# Patient Record
Sex: FEMALE | Race: WHITE
[De-identification: names, ages, dates, MRNs, and addresses within clinical notes are randomized per-mention and may not be internally consistent; named-entity substitution may affect disease eponyms.]

---

## 2018-06-27 ENCOUNTER — HOSPITAL ENCOUNTER (OUTPATIENT)
Dept: HOSPITAL 47 - RADMAMWWP | Age: 53
Discharge: HOME | End: 2018-06-27
Payer: COMMERCIAL

## 2018-06-27 DIAGNOSIS — Z12.31: Primary | ICD-10-CM

## 2018-06-27 PROCEDURE — 77063 BREAST TOMOSYNTHESIS BI: CPT

## 2018-06-27 PROCEDURE — 77067 SCR MAMMO BI INCL CAD: CPT

## 2018-06-28 NOTE — MM
Reason for exam: screening  (asymptomatic).

Last mammogram was performed 4 years ago.



History:

Patient is postmenopausal.



Physical Findings:

A clinical breast exam by your physician is recommended on an annual basis and 

results should be correlated with mammographic findings.



MG 3D Screening Mammo W/Cad

Bilateral CC and MLO view(s) were taken.

Prior study comparison: June 13, 2014, bilateral MG screening mammo w CAD.

The breast tissue is heterogeneously dense. This may lower the sensitivity of 

mammography.  Previous linear density superior left breast no longer present.  No 

significant changes when compared with prior studies.





ASSESSMENT: Negative, BI-RAD 1



RECOMMENDATION:

Routine screening mammogram of both breasts in 1 year.

## 2018-09-21 ENCOUNTER — HOSPITAL ENCOUNTER (OUTPATIENT)
Dept: HOSPITAL 47 - RADCTMAIN | Age: 53
Discharge: HOME | End: 2018-09-21
Payer: COMMERCIAL

## 2018-09-21 DIAGNOSIS — R31.9: Primary | ICD-10-CM

## 2018-09-21 DIAGNOSIS — R10.84: ICD-10-CM

## 2018-09-21 PROCEDURE — 74177 CT ABD & PELVIS W/CONTRAST: CPT

## 2018-09-23 NOTE — CT
EXAMINATION TYPE: CT abdomen pelvis w con

 

DATE OF EXAM: 9/21/2018

 

COMPARISON: 11/21/2016

 

HISTORY: Left sided flank and pelvic pain with distention

 

CT DLP: 1599 mGycm

Automated exposure control for dose reduction was used.

 

CONTRAST: 

CT scan of the abdomen pelvis is performed with IV Contrast, patient injected with 100 mL of Isovue 3
00.

 

FINDINGS- 

LUNG BASES-  No significant abnormality is appreciated. 

 

LIVER/GB-postcholecystectomy changes noted. Hypodensity in the lateral margin of the liver measures a
 centimeter and too small to characterize but stable and therefore likely related to benign.

 

PANCREAS-  No gross abnormality is seen.

 

SPLEEN-  No gross abnormality is seen. 

 

ADRENALS-  No gross abnormality is seen.

 

KIDNEYS/BLADDER- no hydronephrosis or nephrolithiasis. Subcentimeter hypodensity lateral cortex left 
kidney too small to characterize but likely related to simple cyst.

   

BOWEL-gas pattern nonspecific. Changes of diverticulosis noted.. : Limited for wall thickness or muco
sal lesion secondary to lack of distention or contrast within the colon. Normal appendix. 

  

LYMPH NODES-  No greater than 1cm abdominal or pelvic lymph nodes areappreciated. 

 

OSSEOUS STRUCTURES-level degenerative disc disease and hypertrophic changes are noted.. 

 

OTHER-  aorta of normal caliber with atherosclerotic changes. 

 

IMPRESSION- 

1. No acute process.

## 2020-07-13 ENCOUNTER — HOSPITAL ENCOUNTER (OUTPATIENT)
Dept: HOSPITAL 47 - BARWHC3 | Age: 55
Discharge: HOME | End: 2020-07-13
Attending: SURGERY
Payer: COMMERCIAL

## 2020-07-13 VITALS — BODY MASS INDEX: 36.7 KG/M2

## 2020-07-13 VITALS
TEMPERATURE: 98.6 F | RESPIRATION RATE: 16 BRPM | DIASTOLIC BLOOD PRESSURE: 83 MMHG | HEART RATE: 88 BPM | SYSTOLIC BLOOD PRESSURE: 136 MMHG

## 2020-07-13 DIAGNOSIS — E66.01: ICD-10-CM

## 2020-07-13 DIAGNOSIS — Z98.890: ICD-10-CM

## 2020-07-13 DIAGNOSIS — E55.9: ICD-10-CM

## 2020-07-13 DIAGNOSIS — E88.81: Primary | ICD-10-CM

## 2020-07-13 DIAGNOSIS — F17.200: ICD-10-CM

## 2020-07-13 LAB
ERYTHROCYTE [DISTWIDTH] IN BLOOD BY AUTOMATED COUNT: 4.04 M/UL (ref 3.8–5.4)
ERYTHROCYTE [DISTWIDTH] IN BLOOD: 13.5 % (ref 11.5–15.5)
HCT VFR BLD AUTO: 40.3 % (ref 34–46)
HGB BLD-MCNC: 13.4 GM/DL (ref 11.4–16)
MCH RBC QN AUTO: 33.1 PG (ref 25–35)
MCHC RBC AUTO-ENTMCNC: 33.2 G/DL (ref 31–37)
MCV RBC AUTO: 99.7 FL (ref 80–100)
PLATELET # BLD AUTO: 202 K/UL (ref 150–450)
WBC # BLD AUTO: 8.1 K/UL (ref 3.8–10.6)

## 2020-07-13 PROCEDURE — 84425 ASSAY OF VITAMIN B-1: CPT

## 2020-07-13 PROCEDURE — 82607 VITAMIN B-12: CPT

## 2020-07-13 PROCEDURE — 85027 COMPLETE CBC AUTOMATED: CPT

## 2020-07-13 PROCEDURE — 80053 COMPREHEN METABOLIC PANEL: CPT

## 2020-07-13 PROCEDURE — 99201: CPT

## 2020-07-13 PROCEDURE — 93005 ELECTROCARDIOGRAM TRACING: CPT

## 2020-07-13 PROCEDURE — 36415 COLL VENOUS BLD VENIPUNCTURE: CPT

## 2020-07-13 PROCEDURE — 82306 VITAMIN D 25 HYDROXY: CPT

## 2020-07-13 PROCEDURE — 82746 ASSAY OF FOLIC ACID SERUM: CPT

## 2020-07-13 PROCEDURE — 83036 HEMOGLOBIN GLYCOSYLATED A1C: CPT

## 2020-07-13 NOTE — P.HPBAR
Bariatric H&P





- History & Physicial


H&P Date: 07/13/20


History & Physicial: 


Visit/CC: initial visit





Patient initial contact: 7/2/20





Initial weight: 100.108 kg


Initial weight in pounds: 220.70


Height: 5 ft 5 in


Initial BMI: 36.7





Last weight: 





Current weight: 100.108 kg


Current weight in pounds: 220.70


Current BMI: 36.7





Ideal body weight (based on NIH guidelines): 56.699 kg





Excess body weight loss: 0.0%








The patient is a 54 year-old F who presents for Bariatric Assessment.  Patient 

presents today for presurgical consultation.  She is interested in sleeve 

gastrectomy.














Past Medical History


Past Medical History: GERD/Reflux, Hyperlipidemia, Hypertension, Osteoarthritis 

(OA), Thyroid Disorder


History of Any Multi-Drug Resistant Organisms: None Reported


Additional Past Surgical History / Comment(s): uterine ablation, sinus surgery, 

cystic tumore removed left breast, liposuction, oral surgeries


Past Anesthesia/Blood Transfusion Reactions: No Reported Reaction


Past Psychological History: No Psychological Hx Reported


Smoking Status: Current some day smoker





- Past Family History


  ** Mother


Family Medical History: Cancer


Additional Family Medical History / Comment(s): lung CA





Surgical - Exam


                                   Vital Signs











Temp Pulse Resp BP Pulse Ox


 


 98.6 F   88   16   136/83   98 


 


 07/13/20 14:17  07/13/20 14:17  07/13/20 14:17  07/13/20 14:17  07/13/20 14:17














- General


well developed, well nourished, no distress





- Eyes


PERRL





- ENT


normal pinna





- Neck


no masses





- Respiratory


normal expansion





- Cardiovascular


Rhythm: regular





- Abdomen


Abdomen: soft, non tender





Bariatric Assessment & Plan


Plan: 





Morbid obesity, BMI 37.  Patient is an exogenously gastric.  Went over the risks

and benefits of procedure including staple line disruption bleeding scar.  

Patient will undergo EGD.  She'll follow-up in clinic after this is been 

performed.





Bariatric Checklist


Checklist: 


Plan: 





Checklist: 





EGD: 


1. Hiatal hernia: 


2. H. Pylori: 





HgbA1c: 





Vitamin D: 





Smoking: Current some day smoker





Primary care physician referral: [facility]





Psychiatry clearance: 





Cardiology clearance: 





Sleep study: 





Diet journal: 





VTE risk score: 





VTE risk level: 





Rehab needs at discharge:

## 2020-07-14 LAB
ALBUMIN SERPL-MCNC: 4.4 G/DL (ref 3.8–4.9)
ALBUMIN/GLOB SERPL: 2.1 G/DL (ref 1.6–3.17)
ALP SERPL-CCNC: 63 U/L (ref 41–126)
ALT SERPL-CCNC: 45 U/L (ref 8–44)
ANION GAP SERPL CALC-SCNC: 7.4 MMOL/L (ref 4–12)
AST SERPL-CCNC: 33 U/L (ref 13–35)
BUN SERPL-SCNC: 15 MG/DL (ref 9–27)
BUN/CREAT SERPL: 16.67 RATIO (ref 12–20)
CALCIUM SPEC-MCNC: 9.5 MG/DL (ref 8.7–10.3)
CHLORIDE SERPL-SCNC: 108 MMOL/L (ref 96–109)
CO2 SERPL-SCNC: 29.6 MMOL/L (ref 21.6–31.8)
GLOBULIN SER CALC-MCNC: 2.1 G/DL (ref 1.6–3.3)
GLUCOSE SERPL-MCNC: 112 MG/DL (ref 70–110)
HBA1C MFR BLD: 5 % (ref 4–6)
POTASSIUM SERPL-SCNC: 4.6 MMOL/L (ref 3.5–5.5)
PROT SERPL-MCNC: 6.5 G/DL (ref 6.2–8.2)
SODIUM SERPL-SCNC: 145 MMOL/L (ref 135–145)
VIT B12 SERPL-MCNC: 893 PG/ML (ref 200–944)

## 2020-08-07 ENCOUNTER — HOSPITAL ENCOUNTER (OUTPATIENT)
Dept: HOSPITAL 47 - ORWHC2ENDO | Age: 55
End: 2020-08-07
Attending: SURGERY
Payer: COMMERCIAL

## 2020-08-07 VITALS — HEART RATE: 51 BPM | SYSTOLIC BLOOD PRESSURE: 113 MMHG | RESPIRATION RATE: 18 BRPM | DIASTOLIC BLOOD PRESSURE: 66 MMHG

## 2020-08-07 VITALS — TEMPERATURE: 97.8 F

## 2020-08-07 VITALS — BODY MASS INDEX: 37.5 KG/M2

## 2020-08-07 DIAGNOSIS — I10: ICD-10-CM

## 2020-08-07 DIAGNOSIS — M19.90: ICD-10-CM

## 2020-08-07 DIAGNOSIS — F17.200: ICD-10-CM

## 2020-08-07 DIAGNOSIS — Z79.890: ICD-10-CM

## 2020-08-07 DIAGNOSIS — Z79.51: ICD-10-CM

## 2020-08-07 DIAGNOSIS — Z80.9: ICD-10-CM

## 2020-08-07 DIAGNOSIS — Z91.09: ICD-10-CM

## 2020-08-07 DIAGNOSIS — Z88.1: ICD-10-CM

## 2020-08-07 DIAGNOSIS — E66.01: ICD-10-CM

## 2020-08-07 DIAGNOSIS — E07.9: ICD-10-CM

## 2020-08-07 DIAGNOSIS — K29.50: Primary | ICD-10-CM

## 2020-08-07 DIAGNOSIS — K21.9: ICD-10-CM

## 2020-08-07 DIAGNOSIS — Z79.899: ICD-10-CM

## 2020-08-07 DIAGNOSIS — Z98.890: ICD-10-CM

## 2020-08-07 DIAGNOSIS — E78.5: ICD-10-CM

## 2020-08-07 PROCEDURE — 43239 EGD BIOPSY SINGLE/MULTIPLE: CPT

## 2020-08-07 PROCEDURE — 88305 TISSUE EXAM BY PATHOLOGIST: CPT

## 2020-08-07 NOTE — P.GSHP
History of Present Illness


H&P Date: 08/07/20


Chief Complaint: GERD, morbid obesity





This a 54-year-old female who presents today for EGD.  She'll undergo workup for

gastric sleeve surgery.  She's had complaints of GERD.  Her BMI is 37





Past Medical History


Past Medical History: GERD/Reflux, Hyperlipidemia, Hypertension, Osteoarthritis 

(OA), Thyroid Disorder


History of Any Multi-Drug Resistant Organisms: None Reported


Past Surgical History: Breast Surgery, Uterine Ablation


Additional Past Surgical History / Comment(s): sinus surgery, cystic tumor 

removed left breast, liposuction, oral surgeries


Past Anesthesia/Blood Transfusion Reactions: No Reported Reaction


Smoking Status: Current some day smoker





- Past Family History


  ** Mother


Family Medical History: Cancer





Medications and Allergies


                                Home Medications











 Medication  Instructions  Recorded  Confirmed  Type


 


Beclomethasone Dipropionate [Qvar 1 puff INHALATION BID PRN 11/22/16 08/06/20 

History





40 mcg]    


 


Levothyroxine Sodium [Synthroid] 100 mcg PO DAILY 11/22/16 08/06/20 History


 


Losartan Potassium [Cozaar] 25 mg PO DAILY 11/22/16 08/06/20 History


 


Montelukast [Singulair] 10 mg PO HS 11/22/16 08/06/20 History


 


Multivitamins, Thera [Multivitamin] 1 tab PO DAILY 11/22/16 08/06/20 History


 


traZODone  mg PO HS 11/22/16 08/06/20 History


 


Omeprazole 20 mg PO DAILY 07/13/20 08/06/20 History


 


Black Cohosh 540 mg PO DAILY 08/06/20 08/06/20 History


 


Cholecalciferol [Vitamin D3 (25 1,000 unit PO DAILY 08/06/20 08/06/20 History





Mcg = 1000 Iu)]    


 


Cyanocobalamin [Vitamin B-12] 500 mcg PO DAILY 08/06/20 08/06/20 History


 


Pravastatin Sodium [Pravachol] 40 mg PO HS 08/06/20 08/06/20 History








                                    Allergies











Allergy/AdvReac Type Severity Reaction Status Date / Time


 


cat dander Allergy  Unknown Verified 08/06/20 11:57


 


erythromycin base Allergy  Abdominal Verified 08/06/20 11:57





   Pain  














Surgical - Exam


                                   Vital Signs











Temp Pulse Resp BP Pulse Ox


 


 97.8 F   78   18   132/75   98 


 


 08/07/20 10:30  08/07/20 10:30  08/07/20 10:30  08/07/20 10:30  08/07/20 10:30














- General


well developed, well nourished, no distress





- Eyes


PERRL





- ENT


normal pinna





- Neck


no masses





- Respiratory


normal expansion





- Cardiovascular


Rhythm: regular





- Abdomen


Abdomen: soft, non tender





Assessment and Plan


Assessment: 





Morbid obesity, BMI 37





GERD we'll perform EGD.

## 2020-08-07 NOTE — P.OP
Date of Procedure: 08/07/20


Preoperative Diagnosis: 


GERD





Morbid obesity


Postoperative Diagnosis: 


Antral gastritis


Procedure(s) Performed: 


EGD


Anesthesia: MAC


Surgeon: Johnny Jolley


Pathology: other (Antrum)


Condition: stable


Disposition: PACU


Description of Procedure: 


Patient's placed on the endoscopy table in the lateral position.  She received 

IV sedation.  The possible gastroscope some placed oropharynx passed in the 

esophagus and into the stomach.  Scope was then placed through the pylorus.  

First and second portion of the duodenum appeared normal.  Scope was then 

brought back the antrum this.  Mildly inflamed.  A biopsies performed.  Scope 

was retroflexed and remainder stomach appeared normal.  The GE junction was at 

40 cm.  There is no significant hiatal hernia.  The distal esophagus appeared 

normal.  Scope proximal appeared normal esophagus.  Normal scope was withdrawn 

for patient.

## 2021-01-11 ENCOUNTER — HOSPITAL ENCOUNTER (OUTPATIENT)
Dept: HOSPITAL 47 - BARWHC3 | Age: 56
Discharge: HOME | End: 2021-01-11
Attending: SURGERY
Payer: COMMERCIAL

## 2021-01-11 VITALS — BODY MASS INDEX: 36.8 KG/M2

## 2021-01-11 DIAGNOSIS — E66.01: Primary | ICD-10-CM

## 2021-01-11 DIAGNOSIS — Z71.3: ICD-10-CM

## 2021-01-11 PROCEDURE — 97804 MEDICAL NUTRITION GROUP: CPT

## 2021-02-22 ENCOUNTER — HOSPITAL ENCOUNTER (OUTPATIENT)
Dept: HOSPITAL 47 - BARWHC3 | Age: 56
Discharge: HOME | End: 2021-02-22
Attending: SURGERY
Payer: COMMERCIAL

## 2021-02-22 ENCOUNTER — HOSPITAL ENCOUNTER (OUTPATIENT)
Dept: HOSPITAL 47 - LABPAT | Age: 56
Discharge: HOME | End: 2021-02-22
Attending: SURGERY
Payer: COMMERCIAL

## 2021-02-22 VITALS — SYSTOLIC BLOOD PRESSURE: 164 MMHG | DIASTOLIC BLOOD PRESSURE: 69 MMHG | HEART RATE: 60 BPM | TEMPERATURE: 97.7 F

## 2021-02-22 VITALS — BODY MASS INDEX: 36.1 KG/M2

## 2021-02-22 DIAGNOSIS — Z01.818: Primary | ICD-10-CM

## 2021-02-22 LAB
ALBUMIN SERPL-MCNC: 4.4 G/DL (ref 3.5–5)
ALP SERPL-CCNC: 72 U/L (ref 38–126)
ALT SERPL-CCNC: 33 U/L (ref 4–34)
ANION GAP SERPL CALC-SCNC: 10 MMOL/L
AST SERPL-CCNC: 33 U/L (ref 14–36)
BASOPHILS # BLD AUTO: 0.1 K/UL (ref 0–0.2)
BASOPHILS NFR BLD AUTO: 1 %
BUN SERPL-SCNC: 15 MG/DL (ref 7–17)
CALCIUM SPEC-MCNC: 9.6 MG/DL (ref 8.4–10.2)
CHLORIDE SERPL-SCNC: 99 MMOL/L (ref 98–107)
CO2 SERPL-SCNC: 31 MMOL/L (ref 22–30)
EOSINOPHIL # BLD AUTO: 0.6 K/UL (ref 0–0.7)
EOSINOPHIL NFR BLD AUTO: 9 %
ERYTHROCYTE [DISTWIDTH] IN BLOOD BY AUTOMATED COUNT: 4.59 M/UL (ref 3.8–5.4)
ERYTHROCYTE [DISTWIDTH] IN BLOOD: 13 % (ref 11.5–15.5)
GLUCOSE SERPL-MCNC: 103 MG/DL (ref 74–99)
HCT VFR BLD AUTO: 44.4 % (ref 34–46)
HGB BLD-MCNC: 14.4 GM/DL (ref 11.4–16)
LYMPHOCYTES # SPEC AUTO: 1.7 K/UL (ref 1–4.8)
LYMPHOCYTES NFR SPEC AUTO: 28 %
MCH RBC QN AUTO: 31.3 PG (ref 25–35)
MCHC RBC AUTO-ENTMCNC: 32.3 G/DL (ref 31–37)
MCV RBC AUTO: 96.8 FL (ref 80–100)
MONOCYTES # BLD AUTO: 0.4 K/UL (ref 0–1)
MONOCYTES NFR BLD AUTO: 6 %
NEUTROPHILS # BLD AUTO: 3.4 K/UL (ref 1.3–7.7)
NEUTROPHILS NFR BLD AUTO: 54 %
PLATELET # BLD AUTO: 226 K/UL (ref 150–450)
POTASSIUM SERPL-SCNC: 4.4 MMOL/L (ref 3.5–5.1)
PROT SERPL-MCNC: 7.2 G/DL (ref 6.3–8.2)
SODIUM SERPL-SCNC: 140 MMOL/L (ref 137–145)
WBC # BLD AUTO: 6.2 K/UL (ref 3.8–10.6)

## 2021-02-22 PROCEDURE — 85025 COMPLETE CBC W/AUTO DIFF WBC: CPT

## 2021-02-22 PROCEDURE — 80053 COMPREHEN METABOLIC PANEL: CPT

## 2021-02-22 PROCEDURE — 36415 COLL VENOUS BLD VENIPUNCTURE: CPT

## 2021-02-22 PROCEDURE — 99211 OFF/OP EST MAY X REQ PHY/QHP: CPT

## 2021-03-08 ENCOUNTER — HOSPITAL ENCOUNTER (INPATIENT)
Dept: HOSPITAL 47 - 2ORMAIN | Age: 56
LOS: 1 days | Discharge: HOME | DRG: 621 | End: 2021-03-09
Attending: SURGERY | Admitting: SURGERY
Payer: COMMERCIAL

## 2021-03-08 VITALS — BODY MASS INDEX: 34.5 KG/M2

## 2021-03-08 DIAGNOSIS — Z91.048: ICD-10-CM

## 2021-03-08 DIAGNOSIS — Z90.49: ICD-10-CM

## 2021-03-08 DIAGNOSIS — Z79.890: ICD-10-CM

## 2021-03-08 DIAGNOSIS — Z98.890: ICD-10-CM

## 2021-03-08 DIAGNOSIS — Z79.899: ICD-10-CM

## 2021-03-08 DIAGNOSIS — K21.9: ICD-10-CM

## 2021-03-08 DIAGNOSIS — Z98.51: ICD-10-CM

## 2021-03-08 DIAGNOSIS — E66.01: Primary | ICD-10-CM

## 2021-03-08 DIAGNOSIS — E78.5: ICD-10-CM

## 2021-03-08 DIAGNOSIS — Z88.1: ICD-10-CM

## 2021-03-08 DIAGNOSIS — Z80.9: ICD-10-CM

## 2021-03-08 DIAGNOSIS — Z87.891: ICD-10-CM

## 2021-03-08 DIAGNOSIS — I10: ICD-10-CM

## 2021-03-08 DIAGNOSIS — M19.90: ICD-10-CM

## 2021-03-08 DIAGNOSIS — E03.9: ICD-10-CM

## 2021-03-08 PROCEDURE — 80051 ELECTROLYTE PANEL: CPT

## 2021-03-08 PROCEDURE — 74240 X-RAY XM UPR GI TRC 1CNTRST: CPT

## 2021-03-08 PROCEDURE — 94760 N-INVAS EAR/PLS OXIMETRY 1: CPT

## 2021-03-08 PROCEDURE — 84520 ASSAY OF UREA NITROGEN: CPT

## 2021-03-08 PROCEDURE — 88307 TISSUE EXAM BY PATHOLOGIST: CPT

## 2021-03-08 PROCEDURE — 83735 ASSAY OF MAGNESIUM: CPT

## 2021-03-08 PROCEDURE — 85025 COMPLETE CBC W/AUTO DIFF WBC: CPT

## 2021-03-08 PROCEDURE — 94640 AIRWAY INHALATION TREATMENT: CPT

## 2021-03-08 PROCEDURE — 82565 ASSAY OF CREATININE: CPT

## 2021-03-08 PROCEDURE — 0DB64Z3 EXCISION OF STOMACH, PERCUTANEOUS ENDOSCOPIC APPROACH, VERTICAL: ICD-10-PCS

## 2021-03-08 PROCEDURE — 84100 ASSAY OF PHOSPHORUS: CPT

## 2021-03-08 PROCEDURE — 82310 ASSAY OF CALCIUM: CPT

## 2021-03-08 PROCEDURE — 76942 ECHO GUIDE FOR BIOPSY: CPT

## 2021-03-08 RX ADMIN — KETOROLAC TROMETHAMINE SCH MG: 15 INJECTION, SOLUTION INTRAMUSCULAR; INTRAVENOUS at 12:31

## 2021-03-08 RX ADMIN — SIMETHICONE PRN MG: 20 SUSPENSION/ DROPS ORAL at 15:13

## 2021-03-08 RX ADMIN — KETOROLAC TROMETHAMINE SCH MG: 15 INJECTION, SOLUTION INTRAMUSCULAR; INTRAVENOUS at 22:43

## 2021-03-08 RX ADMIN — POTASSIUM CHLORIDE SCH MLS: 14.9 INJECTION, SOLUTION INTRAVENOUS at 07:47

## 2021-03-08 RX ADMIN — ENOXAPARIN SODIUM SCH: 40 INJECTION SUBCUTANEOUS at 18:56

## 2021-03-08 RX ADMIN — ISODIUM CHLORIDE SCH: 0.03 SOLUTION RESPIRATORY (INHALATION) at 12:33

## 2021-03-08 RX ADMIN — KETOROLAC TROMETHAMINE SCH MG: 15 INJECTION, SOLUTION INTRAMUSCULAR; INTRAVENOUS at 17:03

## 2021-03-08 RX ADMIN — POTASSIUM CHLORIDE SCH: 14.9 INJECTION, SOLUTION INTRAVENOUS at 23:40

## 2021-03-08 RX ADMIN — THIAMINE HYDROCHLORIDE SCH MLS/HR: 100 INJECTION, SOLUTION INTRAMUSCULAR; INTRAVENOUS at 13:14

## 2021-03-08 RX ADMIN — ISODIUM CHLORIDE SCH: 0.03 SOLUTION RESPIRATORY (INHALATION) at 17:38

## 2021-03-08 RX ADMIN — THIAMINE HYDROCHLORIDE SCH MLS/HR: 100 INJECTION, SOLUTION INTRAMUSCULAR; INTRAVENOUS at 17:06

## 2021-03-08 RX ADMIN — ISODIUM CHLORIDE SCH MG: 0.03 SOLUTION RESPIRATORY (INHALATION) at 21:27

## 2021-03-08 RX ADMIN — HYDROMORPHONE HYDROCHLORIDE PRN MG: 1 INJECTION, SOLUTION INTRAMUSCULAR; INTRAVENOUS; SUBCUTANEOUS at 10:08

## 2021-03-08 RX ADMIN — THIAMINE HYDROCHLORIDE SCH: 100 INJECTION, SOLUTION INTRAMUSCULAR; INTRAVENOUS at 19:29

## 2021-03-08 RX ADMIN — HYDROMORPHONE HYDROCHLORIDE PRN MG: 1 INJECTION, SOLUTION INTRAMUSCULAR; INTRAVENOUS; SUBCUTANEOUS at 10:13

## 2021-03-08 NOTE — P.GSHP
History of Present Illness


H&P Date: 03/08/21


Chief Complaint: Morbid obesity, BMI 35





This is a 55-year-old female who presents today for laparoscopic sleeve gastric.

 Patient had lifetime problems obesity.  Her BMI is 35.  She has been well 

detailed on the procedure sleeve gastrectomy she understood the risks and benef

its procedure.  She is aware the risk of gastric staple line disruption, 

bleeding and scarring.





Past Medical History


Past Medical History: GERD/Reflux, Hyperlipidemia, Hypertension, Osteoarthritis 

(OA), Thyroid Disorder


History of Any Multi-Drug Resistant Organisms: None Reported


Past Surgical History: Breast Surgery, Cholecystectomy, Tubal Ligation, Uterine 

Ablation


Additional Past Surgical History / Comment(s): sinus surgery, cystic tumor 

removed left breast-BENIGN, liposuction, oral surgeries, EGD


Past Anesthesia/Blood Transfusion Reactions: Postoperative Nausea & Vomiting 

(PONV)


Past Psychological History: No Psychological Hx Reported


Smoking Status: Former smoker


Past Alcohol Use History: None Reported


Additional Past Alcohol Use History / Comment(s): QUIT SMOKING 1/2021.  glass of

wine daily


Past Drug Use History: None Reported





- Past Family History


  ** Mother


Family Medical History: Cancer





Medications and Allergies


                                Home Medications











 Medication  Instructions  Recorded  Confirmed  Type


 


Beclomethasone Dipropionate [Qvar 1 puff INHALATION BID PRN 11/22/16 03/02/21 

History





40 mcg]    


 


Levothyroxine Sodium [Synthroid] 100 mcg PO DAILY 11/22/16 03/02/21 History


 


Losartan Potassium [Cozaar] 25 mg PO DAILY 11/22/16 03/02/21 History


 


Montelukast [Singulair] 10 mg PO HS 11/22/16 03/02/21 History


 


Multivitamins, Thera [Multivitamin] 1 tab PO DAILY 11/22/16 03/02/21 History


 


traZODone  mg PO HS 11/22/16 03/02/21 History


 


Omeprazole 20 mg PO DAILY 07/13/20 03/02/21 History


 


Black Cohosh 540 mg PO DAILY 08/06/20 03/02/21 History


 


Cholecalciferol [Vitamin D3 (25 1,000 unit PO DAILY 08/06/20 03/02/21 History





Mcg = 1000 Iu)]    


 


Cyanocobalamin [Vitamin B-12] 500 mcg PO DAILY 08/06/20 03/02/21 History


 


Pravastatin Sodium [Pravachol] 40 mg PO HS 08/06/20 03/02/21 History








                                    Allergies











Allergy/AdvReac Type Severity Reaction Status Date / Time


 


cat dander Allergy  Unknown Verified 03/02/21 11:37


 


erythromycin base Allergy  Abdominal Verified 03/02/21 11:37





   Pain  














Surgical - Exam


                                   Vital Signs











Temp Pulse Resp BP Pulse Ox


 


 98 F   52 L  20   124/71   94 L


 


 03/08/21 07:21  03/08/21 07:21  03/08/21 07:21  03/08/21 07:21  03/08/21 07:21














- General


well developed, well nourished, no distress





- Eyes


PERRL





- ENT


normal pinna





- Neck


no masses





- Respiratory


normal expansion





- Cardiovascular


Rhythm: regular





- Abdomen


Abdomen: soft, non tender





Assessment and Plan


Assessment: 





Morbid obesity, BMI 35.  We'll perform laparoscopic sleeve gastrectomy

## 2021-03-08 NOTE — P.ANPRN
Procedure Note - Anesthesia





- Nerve Block Performed


  ** Bilateral Erector Spinae Single


Time Out Performed: Yes


Date of Procedure: 03/08/21


Procedure Start Time: 08:15


Procedure Stop Time: 08:24


Location of Patient: PreOp


Indication: Acute Post-Operative Pain, Requested by Surgeon


Sedation Type: Sedate with meaningful contact maintained


Preparation: Sterile Prep


Position: Prone


Needle Types: Pajunk


Needle Gauge: 21


Ultrasound used to visualize needle placement: Yes


Ultrasound used to observe medication spread: Yes


Blood Aspirated: No


Pain Paresthesia on Injection Noted: No


Resistance on Injection: Normal


Image Stored and Saved: Yes


Events: Uneventful and Well Tolerated (ropi .25% 30cc at t10)

## 2021-03-08 NOTE — P.OP
Date of Procedure: 03/08/21


Preoperative Diagnosis: 


Mild obesity


Postoperative Diagnosis: 


Morbid obesity


Procedure(s) Performed: 


Laparoscopic sleeve gastrectomy


Anesthesia: AIDE


Surgeon: Johnny Jolley


Estimated Blood Loss (ml): 5


Pathology: other (Stomach)


Condition: stable


Disposition: PACU


Description of Procedure: 


The patient was placed on the operating room table in the supine position.  She 

received general anesthesia and then was placed in dorsal lithotomy position.  

Her abdomen was prepped and draped in sterile fashion.  The skin incision sites 

were anesthetized 1% local Xylocaine.  And then the skin was incised with an 11 

blade in the left lateral position.  Using a blade less trocar under direct 

visualization the peritoneal cavity was entered.  The abdomen was insufflated 

and then a 5 mm laparoscope was placed into the peritoneal cavity.  A 5 mm 

trocar was placed in the right epigastric, and right lateral position.  A 15 mm 

trocar was placed in the supra-umbilical position and another 5 mm trocar was 

placed in the left lateral position.  The left lateral lobe of the liver was 

retracted.  The stomach was visualized.  The greater curvature of the stomach 

was then dissected using the Harmonic scissors.  The dissection occurred 

approximately 5 cm from the pylorus to the level of the left bruna.  There was no

hiatal hernia seen.  At this point a 40-British bougie dilator was placed the 

oropharynx and passed into the esophagus and into the stomach by the CRNA.  The 

sleeve gastrectomy was performed by using the powered echelon stapler with a 

seam guard buttress material.  Sequential firings of the stapler were performed.

 The gastric remnant was then brought out through the 15 mm trocar site.  The 

dilator was withdrawn.  And a orogastric tube was replaced into the stomach.  

The stomach was insufflated with 200 mL of methylene blue normal saline.  There 

was no evidence of extravasation.  The abdomen was irrigated there is no 

bleeding seen.  The Victor Manuel-Deya device was used to close the 15 mm trocar 

with 0 Vicryl.  Skin was closed with interrupted 3-0 Monocryl sutures once the 

trochars withdrawn.  Dermabond dressing was applied.  Patient was sent to 

recovery in stable condition.

## 2021-03-09 VITALS — TEMPERATURE: 98.2 F | HEART RATE: 53 BPM | SYSTOLIC BLOOD PRESSURE: 186 MMHG | DIASTOLIC BLOOD PRESSURE: 81 MMHG

## 2021-03-09 VITALS — RESPIRATION RATE: 16 BRPM

## 2021-03-09 LAB
ANION GAP SERPL CALC-SCNC: 8.2 MMOL/L (ref 4–12)
BASOPHILS # BLD AUTO: 0.01 X 10*3/UL (ref 0–0.1)
BASOPHILS NFR BLD AUTO: 0.1 %
BUN SERPL-SCNC: 12 MG/DL (ref 9–27)
CALCIUM SPEC-MCNC: 8.7 MG/DL (ref 8.7–10.3)
CHLORIDE SERPL-SCNC: 105 MMOL/L (ref 96–109)
CO2 SERPL-SCNC: 26.8 MMOL/L (ref 21.6–31.8)
EOSINOPHIL # BLD AUTO: 0 X 10*3/UL (ref 0.04–0.35)
EOSINOPHIL NFR BLD AUTO: 0 %
ERYTHROCYTE [DISTWIDTH] IN BLOOD BY AUTOMATED COUNT: 3.56 X 10*6/UL (ref 4.1–5.2)
ERYTHROCYTE [DISTWIDTH] IN BLOOD: 12.2 % (ref 11.5–14.5)
HCT VFR BLD AUTO: 34.5 % (ref 37.2–46.3)
HGB BLD-MCNC: 11.1 G/DL (ref 12–15)
LYMPHOCYTES # SPEC AUTO: 1.85 X 10*3/UL (ref 0.9–5)
LYMPHOCYTES NFR SPEC AUTO: 22.6 %
MAGNESIUM SPEC-SCNC: 1.8 MG/DL (ref 1.5–2.4)
MCH RBC QN AUTO: 31.2 PG (ref 27–32)
MCHC RBC AUTO-ENTMCNC: 32.2 G/DL (ref 32–37)
MCV RBC AUTO: 96.9 FL (ref 80–97)
MONOCYTES # BLD AUTO: 0.6 X 10*3/UL (ref 0.2–1)
MONOCYTES NFR BLD AUTO: 7.3 %
NEUTROPHILS # BLD AUTO: 5.72 X 10*3/UL (ref 1.8–7.7)
NEUTROPHILS NFR BLD AUTO: 69.8 %
PLATELET # BLD AUTO: 193 X 10*3/UL (ref 140–440)
POTASSIUM SERPL-SCNC: 4.1 MMOL/L (ref 3.5–5.5)
SODIUM SERPL-SCNC: 140 MMOL/L (ref 135–145)
WBC # BLD AUTO: 8.2 X 10*3/UL (ref 4.5–10)

## 2021-03-09 RX ADMIN — SIMETHICONE PRN MG: 20 SUSPENSION/ DROPS ORAL at 08:46

## 2021-03-09 RX ADMIN — THIAMINE HYDROCHLORIDE SCH: 100 INJECTION, SOLUTION INTRAMUSCULAR; INTRAVENOUS at 05:03

## 2021-03-09 RX ADMIN — ISODIUM CHLORIDE SCH MG: 0.03 SOLUTION RESPIRATORY (INHALATION) at 09:19

## 2021-03-09 RX ADMIN — KETOROLAC TROMETHAMINE SCH MG: 15 INJECTION, SOLUTION INTRAMUSCULAR; INTRAVENOUS at 11:12

## 2021-03-09 RX ADMIN — ISODIUM CHLORIDE SCH MG: 0.03 SOLUTION RESPIRATORY (INHALATION) at 12:12

## 2021-03-09 RX ADMIN — ISODIUM CHLORIDE SCH: 0.03 SOLUTION RESPIRATORY (INHALATION) at 17:16

## 2021-03-09 RX ADMIN — KETOROLAC TROMETHAMINE SCH MG: 15 INJECTION, SOLUTION INTRAMUSCULAR; INTRAVENOUS at 05:12

## 2021-03-09 RX ADMIN — ENOXAPARIN SODIUM SCH: 40 INJECTION SUBCUTANEOUS at 11:06

## 2021-03-09 RX ADMIN — SIMETHICONE PRN MG: 20 SUSPENSION/ DROPS ORAL at 05:12

## 2021-03-09 NOTE — P.CONS
History of Present Illness





- Reason for Consult


Consult date: 03/09/21


medical eval





- Chief Complaint


obesity





- History of Present Illness





Sindi Solorzano is a 54 yo F with hx morbid obesity, HTN, hypothyroidism who is 

admitted for scheduled sleeve gastrectomy. She is POD#1, feeling well today, 

minimal abdominal pain and denies chest pain or shortness of breath. She is 

tolerating her breakfast. Her BP is elevated today.





Review of Systems


All systems: negative


Constitutional: Denies chills, Denies fever


Eyes: denies blurred vision, denies pain


Ears, nose, mouth and throat: Denies headache, Denies sore throat


Cardiovascular: Denies chest pain, Denies shortness of breath


Respiratory: Denies cough


Gastrointestinal: Reports abdominal pain, Denies diarrhea, Denies nausea, Denies

vomiting


Genitourinary: Denies dysuria, Denies hematuria


Musculoskeletal: Denies myalgias


Integumentary: Denies pruritus, Denies rash


Neurological: Denies numbness, Denies weakness


Psychiatric: Denies anxiety, Denies depression


Endocrine: Denies fatigue, Denies weight change





Past Medical History


Past Medical History: GERD/Reflux, Hyperlipidemia, Hypertension, Osteoarthritis 

(OA), Thyroid Disorder


History of Any Multi-Drug Resistant Organisms: None Reported


Past Surgical History: Breast Surgery, Cholecystectomy, Tubal Ligation, Uterine 

Ablation


Additional Past Surgical History / Comment(s): sinus surgery, cystic tumor remov

ed left breast-BENIGN, liposuction, oral surgeries, EGD


Past Anesthesia/Blood Transfusion Reactions: Postoperative Nausea & Vomiting 

(PONV)


Past Psychological History: No Psychological Hx Reported


Smoking Status: Former smoker


Past Alcohol Use History: None Reported


Additional Past Alcohol Use History / Comment(s): QUIT SMOKING 1/2021.  glass of

wine daily


Past Drug Use History: None Reported





- Past Family History


  ** Mother


Family Medical History: Cancer





Medications and Allergies


                                Home Medications











 Medication  Instructions  Recorded  Confirmed  Type


 


Beclomethasone Dipropionate [Qvar 1 puff INHALATION BID PRN 11/22/16 03/02/21 

History





40 mcg]    


 


Levothyroxine Sodium [Synthroid] 100 mcg PO DAILY 11/22/16 03/02/21 History


 


Losartan Potassium [Cozaar] 25 mg PO DAILY 11/22/16 03/02/21 History


 


Montelukast [Singulair] 10 mg PO HS 11/22/16 03/02/21 History


 


traZODone  mg PO HS 11/22/16 03/02/21 History


 


Pravastatin Sodium [Pravachol] 40 mg PO HS 08/06/20 03/02/21 History


 


HYDROcodone/APAP 5-325MG [Norco 1 tab PO Q6HR PRN 2 Days #5 tab 03/09/21  Rx





5-325]    


 


Omeprazole [PriLOSEC] 40 mg PO DAILY #30 capsule. 03/09/21  Rx


 


Ondansetron Odt [Zofran Odt] 4 mg PO Q8HR PRN #9 tab 03/09/21  Rx


 


Simethicone 40 mg/0.6 ml Drops 40 mg PO PCHS PRN #30 ml 03/09/21  Rx





[Mylicon Drops]    


 


bisacodyL [Dulcolax] 5 mg PO DAILY PRN #10 tablet. 03/09/21  Rx








                                    Allergies











Allergy/AdvReac Type Severity Reaction Status Date / Time


 


cat dander Allergy  Unknown Verified 03/02/21 11:37


 


erythromycin base Allergy  Abdominal Verified 03/02/21 11:37





   Pain  














Physical Exam


Vitals: 


                                   Vital Signs











  Temp Pulse Pulse Resp BP Pulse Ox


 


 03/09/21 14:00  98.2 F   53 L  16  186/81  95


 


 03/09/21 12:23   64    


 


 03/09/21 12:13   64    


 


 03/09/21 09:30   60    


 


 03/09/21 09:24       97


 


 03/09/21 09:19   60    


 


 03/09/21 08:49  98.1 F   68  16  146/79  94 L


 


 03/09/21 01:17       95


 


 03/09/21 00:34  98.5 F   60  15  148/83  94 L


 


 03/08/21 21:44   62    


 


 03/08/21 21:27   60     92 L


 


 03/08/21 19:02    55 L  15  


 


 03/08/21 18:53  99.0 F   55 L  15  170/76  92 L








                                Intake and Output











 03/09/21 03/09/21 03/09/21





 06:59 14:59 22:59


 


Intake Total 1500  


 


Balance 1500  


 


Intake:   


 


  Intake, IV Titration 1200  





  Amount   


 


    0.9% NaCl with KCl 20 Meq 1200  





    /l 1,000 ml @ 100 mls/hr   





    IV .BY DURATION [facility] Rx#:   





    [id number]   


 


  Oral 300  


 


Other:   


 


  Weight  95.7 kg 














General: well nourished, well developed, NAD. Obese. Vitals reviewed


Eyes: PERRL, EOMI, conjunctiva normal


HENT: normocephalic, mucus membranes moist


Neck: supple, no JVD


Lungs: normal respiratory effort, no wheezes or rales


CV: Regular rate and rhythm, no murmur. Peripheral pulses 2+


Abdomen: soft, nondistended, no organomegaly


Lymph: no cervical or axillary LAD


Skin: warm and dry.


Neuro: A&Ox3, normal mood and affect





Results


CBC & Chem 7: 


                                 03/09/21 06:00





                                 03/09/21 06:00


Labs: 


                  Abnormal Lab Results - Last 24 Hours (Table)











  03/09/21 Range/Units





  06:00 


 


RBC  3.56 L  (4.10-5.20)  X 10*6/uL


 


Hgb  11.1 L  (12.0-15.0)  g/dL


 


Hct  34.5 L  (37.2-46.3)  %


 


Eosinophils #  0 L  (0.04-0.35)  X 10*3/uL














Assessment and Plan


(1) Morbid obesity


Current Visit: Yes   Status: Acute   Code(s): E66.01 - MORBID (SEVERE) OBESITY 

DUE TO EXCESS CALORIES   SNOMED Code(s): 286518396


   





(2) Status post laparoscopic sleeve gastrectomy


Current Visit: Yes   Status: Acute   Code(s): Z98.84 - BARIATRIC SURGERY STATUS 

  SNOMED Code(s): 625306237186623


   





(3) Essential hypertension


Current Visit: Yes   Status: Acute   Code(s): I10 - ESSENTIAL (PRIMARY) 

HYPERTENSION   SNOMED Code(s): 07921155


   





(4) Hypothyroid


Current Visit: Yes   Status: Acute   Code(s): E03.9 - HYPOTHYROIDISM, 

UNSPECIFIED   SNOMED Code(s): 76017482


   


Plan: 





1. Morbid obesity s/p sleeve gastrectomy. Management per primary. Bariatric 

diet, pain control


2. HTN. Resume home losartan


3. Hypothyroidism. Continue synthroid

## 2021-03-09 NOTE — P.DS
Providers


Date of admission: 


03/08/21 06:41





Expected date of discharge: 03/09/21


Attending physician: 


Johnny Jolley





Consults: 





                                        





03/08/21 10:02


Consult Physician Routine 


   Consulting Provider: Vladimir Yates


   Consult Reason/Comments: med manage


   Do you want consulting provider notified?: Yes











Primary care physician: 


Stella Rodriguez





Hospital Course: 





Discharge diagnosis


1.  Morbid obesity status post laparoscopic sleeve gastrectomy





Hospital course


This is a 55-year-old female with a known history of morbid obesity with a BMI 

of 35.  She is status post laparoscopic sleeve gastrectomy.  She tolerated 

surgery well.  Her upper GI showed no evidence of obstruction or leak.  She's 

tolerating her bariatric clear liquid diet.  She reports that her pain is 

controlled.  She is up and ambulating.  She is afebrile.  She is stable for 

discharge.  Patient did have elevated blood pressure.  Her home medication was 

restarted.  We'll have nursing staff recheck blood pressure 1 hour after her 

home blood pressure medication given.  If blood pressure is stable patient can 

be discharged home.  Please refer to chart for any further details.





Physician Assistant note has been reviewed by physician. Signing provider agrees

with the documented findings, assessment, and plan of care. 


Patient Condition at Discharge: Stable





Plan - Discharge Summary


Discharge Rx Participant: Yes


New Discharge Prescriptions: 


New


   bisacodyL [Dulcolax] 5 mg PO DAILY PRN #10 tablet.dr


     PRN Reason: Constipation


   Simethicone 40 mg/0.6 ml Drops [Mylicon Drops] 40 mg PO PCHS PRN #30 ml


     PRN Reason: Gas


   HYDROcodone/APAP 5-325MG [Norco 5-325] 1 tab PO Q6HR PRN 2 Days #5 tab


     PRN Reason: Pain


   Omeprazole [PriLOSEC] 40 mg PO DAILY #30 capsule.


   Ondansetron Odt [Zofran Odt] 4 mg PO Q8HR PRN #9 tab


     PRN Reason: Nausea





Continue


   traZODone  mg PO HS


   Montelukast [Singulair] 10 mg PO HS


   Losartan Potassium [Cozaar] 25 mg PO DAILY


   Levothyroxine Sodium [Synthroid] 100 mcg PO DAILY


   Beclomethasone Dipropionate [Qvar 40 mcg] 1 puff INHALATION BID PRN


     PRN Reason: Dyspnea


   Pravastatin Sodium [Pravachol] 40 mg PO HS





Discontinued


   Multivitamins, Thera [Multivitamin] 1 tab PO DAILY


   Omeprazole 20 mg PO DAILY


   Cyanocobalamin [Vitamin B-12] 500 mcg PO DAILY


   Cholecalciferol [Vitamin D3 (25 Mcg = 1000 Iu)] 1,000 unit PO DAILY


   Black Cohosh 540 mg PO DAILY


Discharge Medication List





Beclomethasone Dipropionate [Qvar 40 mcg] 1 puff INHALATION BID PRN 11/22/16 

[History]


Levothyroxine Sodium [Synthroid] 100 mcg PO DAILY 11/22/16 [History]


Losartan Potassium [Cozaar] 25 mg PO DAILY 11/22/16 [History]


Montelukast [Singulair] 10 mg PO HS 11/22/16 [History]


traZODone  mg PO HS 11/22/16 [History]


Pravastatin Sodium [Pravachol] 40 mg PO HS 08/06/20 [History]


HYDROcodone/APAP 5-325MG [Norco 5-325] 1 tab PO Q6HR PRN 2 Days #5 tab 03/09/21 

[Rx]


Omeprazole [PriLOSEC] 40 mg PO DAILY #30 capsule. 03/09/21 [Rx]


Ondansetron Odt [Zofran Odt] 4 mg PO Q8HR PRN #9 tab 03/09/21 [Rx]


Simethicone 40 mg/0.6 ml Drops [Mylicon Drops] 40 mg PO PCHS PRN #30 ml 03/09/21

[Rx]


bisacodyL [Dulcolax] 5 mg PO DAILY PRN #10 tablet. 03/09/21 [Rx]








Follow up Appointment(s)/Referral(s): 


[facility] [[facility]STAFF] - 03/12/21 10:00 am


Stella Rodriguez DO [Primary Care Provider] - 03/16/21 9:00 am ([place] office )


Patient Instructions/Handouts:  Nutrition after Bariatric Surgery (DC), 

Laparoscopic Sleeve Gastrectomy (DC)


Activity/Diet/Wound Care/Special Instructions: 


No driving while taking Norco


No lifting over 10 pounds


You may shower. No soaking or tub baths for 2 weeks


Very light activity until you are reevaluated at your follow up appointment with

your surgeon


No carbonated beverages or straws


Cut or crush all pills to the size smaller than a tic tac





Hold on taking any vitamins for 1 week after surgery


Discharge Disposition: HOME SELF-CARE

## 2021-03-09 NOTE — FL
EXAMINATION TYPE: FL UGI

 

DATE OF EXAM: 3/9/2021

 

LIMITED UGI:

 

CLINICAL HISTORY:  Morbid Obesity, gastric sleeve surgery yesterday.

 

TECHNIQUE:  Limited esophagram is performed utilizing 25oz of contrast. A total of 0.58 minutes of fl
uoroscopic time was utilized during procedure and 28 images obtained.

 

COMPARISON:  CT September 21, 2018.

 

FINDINGS:  The patient swallowed contrast without difficulty or delay.  Esophageal peristalsis and mo
tility are within normal limits.  There is good flow of contrast along the diaphragmatic hiatus into 
proximal stomach and subsequent flow through proximal anastomosis into gastric sleeve. There is mild 
delay in flow from distal sleeve and anastomosis into pylorus and duodenal sweep. Patient however rem
ains asymptomatic. There is no evidence of contrast extravasation to suggest leak. Cholecystectomy cl
ips are redemonstrated.

 

IMPRESSION: No evidence of leak or significant obstruction status post recent gastric sleeve surgery 
yesterday.

## 2021-03-12 ENCOUNTER — HOSPITAL ENCOUNTER (OUTPATIENT)
Dept: HOSPITAL 47 - BARWHC3 | Age: 56
Discharge: HOME | End: 2021-03-12
Attending: SURGERY
Payer: COMMERCIAL

## 2021-03-12 VITALS — SYSTOLIC BLOOD PRESSURE: 112 MMHG | DIASTOLIC BLOOD PRESSURE: 71 MMHG | HEART RATE: 61 BPM | TEMPERATURE: 98.1 F

## 2021-03-12 VITALS — BODY MASS INDEX: 34.1 KG/M2

## 2021-03-12 DIAGNOSIS — Z48.815: Primary | ICD-10-CM

## 2021-03-12 DIAGNOSIS — Z98.84: ICD-10-CM

## 2021-03-12 PROCEDURE — 99211 OFF/OP EST MAY X REQ PHY/QHP: CPT

## 2021-03-22 ENCOUNTER — HOSPITAL ENCOUNTER (OUTPATIENT)
Dept: HOSPITAL 47 - BARWHC3 | Age: 56
End: 2021-03-22
Attending: SURGERY
Payer: COMMERCIAL

## 2021-03-22 VITALS — BODY MASS INDEX: 32.8 KG/M2

## 2021-03-22 VITALS
SYSTOLIC BLOOD PRESSURE: 143 MMHG | RESPIRATION RATE: 18 BRPM | TEMPERATURE: 98.2 F | HEART RATE: 74 BPM | DIASTOLIC BLOOD PRESSURE: 95 MMHG

## 2021-03-22 DIAGNOSIS — I10: ICD-10-CM

## 2021-03-22 DIAGNOSIS — M19.90: ICD-10-CM

## 2021-03-22 DIAGNOSIS — E78.5: ICD-10-CM

## 2021-03-22 DIAGNOSIS — Z87.891: ICD-10-CM

## 2021-03-22 DIAGNOSIS — Z98.84: Primary | ICD-10-CM

## 2021-03-22 PROCEDURE — 97802 MEDICAL NUTRITION INDIV IN: CPT

## 2021-03-22 PROCEDURE — 99211 OFF/OP EST MAY X REQ PHY/QHP: CPT

## 2021-03-22 NOTE — P.HPBAR
Bariatric H&P





- History & Physicial


H&P Date: 03/22/21


History & Physicial: 


Visit/CC: surgical follow up / 2 weeks





Patient initial contact: 7/2/20





Initial weight: 100.108 kg


Initial weight in pounds: 220.70


Height: 5 ft 5 in


Initial BMI: 36.7





Last weight: 





Current weight: 89.358 kg


Current weight in pounds: 197.00


Current BMI: 32.8





Ideal body weight (based on NIH guidelines): 56.8 kg





Excess body weight loss: 24.7%








The patient is a 55 year-old F who presents for Bariatric Assessment.  Patient 

presents today for sleeve gastrectomy follow-up.  She is doing quite well.  

She's had some mild GERD.














Past Medical History


Past Medical History: GERD/Reflux, Hyperlipidemia, Hypertension, Osteoarthritis 

(OA), Thyroid Disorder


History of Any Multi-Drug Resistant Organisms: None Reported


Past Surgical History: Bariatric Surgery, Breast Surgery, Uterine Ablation


Additional Past Surgical History / Comment(s): sinus surgery, cystic tumor 

removed left breast, liposuction, oral surgeries sleeve gastrectomy 3-8-21


Past Anesthesia/Blood Transfusion Reactions: No Reported Reaction


Past Psychological History: No Psychological Hx Reported


Smoking Status: Former smoker


Past Alcohol Use History: Daily


Additional Past Alcohol Use History / Comment(s): smokes a few times per week 

only, glass of wine daily


Past Drug Use History: None Reported





- Past Family History


  ** Mother


Family Medical History: Cancer





Surgical - Exam


                                   Vital Signs











Temp Pulse Resp BP


 


 98.2 F   74   18   143/95 


 


 03/22/21 13:26  03/22/21 13:26  03/22/21 13:26  03/22/21 13:26














- General


well developed, well nourished, no distress





- Eyes


PERRL





- ENT


normal pinna





- Neck


no masses





- Respiratory


normal expansion





- Cardiovascular


Rhythm: regular





- Abdomen


Abdomen: soft, non tender





Bariatric Assessment & Plan


Plan: 





Status post sleeve gastrectomy.  Patient's creatinine is minimal will be 

observed.  She'll follow-up in 4 weeks.





Bariatric Checklist


Checklist: 


Plan: 





Checklist: 





EGD: 


1. Hiatal hernia: 


2. H. Pylori: 





HgbA1c: 





Vitamin D: 





Smoking: Current some day smoker





Primary care physician referral: [facility] (Liyah David NP)





Psychiatry clearance: 





Cardiology clearance: 





Sleep study: 





Diet journal: 





VTE risk score: 





VTE risk level: 





Rehab needs at discharge:

## 2021-04-05 ENCOUNTER — HOSPITAL ENCOUNTER (OUTPATIENT)
Dept: HOSPITAL 47 - BARWHC3 | Age: 56
Discharge: HOME | End: 2021-04-05
Attending: SURGERY
Payer: COMMERCIAL

## 2021-04-05 VITALS
DIASTOLIC BLOOD PRESSURE: 71 MMHG | RESPIRATION RATE: 18 BRPM | TEMPERATURE: 98.5 F | HEART RATE: 76 BPM | SYSTOLIC BLOOD PRESSURE: 106 MMHG

## 2021-04-05 VITALS — BODY MASS INDEX: 31.9 KG/M2

## 2021-04-05 DIAGNOSIS — E66.01: Primary | ICD-10-CM

## 2021-04-05 DIAGNOSIS — Z71.3: ICD-10-CM

## 2021-04-05 PROCEDURE — 97803 MED NUTRITION INDIV SUBSEQ: CPT

## 2021-04-05 PROCEDURE — 99211 OFF/OP EST MAY X REQ PHY/QHP: CPT

## 2021-05-17 ENCOUNTER — HOSPITAL ENCOUNTER (OUTPATIENT)
Dept: HOSPITAL 47 - BARWHC3 | Age: 56
End: 2021-05-17
Attending: SURGERY
Payer: COMMERCIAL

## 2021-05-17 VITALS
SYSTOLIC BLOOD PRESSURE: 144 MMHG | HEART RATE: 53 BPM | TEMPERATURE: 98.5 F | RESPIRATION RATE: 18 BRPM | DIASTOLIC BLOOD PRESSURE: 88 MMHG

## 2021-05-17 VITALS — BODY MASS INDEX: 30.2 KG/M2

## 2021-05-17 DIAGNOSIS — Z87.891: ICD-10-CM

## 2021-05-17 DIAGNOSIS — Z98.84: ICD-10-CM

## 2021-05-17 DIAGNOSIS — I10: ICD-10-CM

## 2021-05-17 DIAGNOSIS — Z88.1: ICD-10-CM

## 2021-05-17 DIAGNOSIS — K21.9: ICD-10-CM

## 2021-05-17 DIAGNOSIS — Z91.048: ICD-10-CM

## 2021-05-17 DIAGNOSIS — Z09: Primary | ICD-10-CM

## 2021-05-17 DIAGNOSIS — M19.90: ICD-10-CM

## 2021-05-17 DIAGNOSIS — E78.5: ICD-10-CM

## 2021-05-17 PROCEDURE — 99211 OFF/OP EST MAY X REQ PHY/QHP: CPT

## 2021-05-17 NOTE — P.HPBAR
Bariatric H&P





- History & Physicial


H&P Date: 05/17/21


History & Physicial: 


Visit/CC: follow up





Patient initial contact: 7/2/20





Initial weight: 100.108 kg


Initial weight in pounds: 220.70


Height: 5 ft 5 in


Initial BMI: 36.7





Last weight: 





Current weight: 82.554 kg


Current weight in pounds: 182.00


Current BMI: 30.2





Ideal body weight (based on NIH guidelines): 56.699 kg





Excess body weight loss: 40.4%








The patient is a 55 year-old F who presents for Bariatric Assessment.  Patient 

presents for follow-up.  She's not been seen in a while.  He's had some minimal 

GERD.














Past Medical History


Past Medical History: GERD/Reflux, Hyperlipidemia, Hypertension, Osteoarthritis 

(OA), Thyroid Disorder


History of Any Multi-Drug Resistant Organisms: None Reported


Past Surgical History: Bariatric Surgery, Breast Surgery, Uterine Ablation


Additional Past Surgical History / Comment(s): sinus surgery, cystic tumor 

removed left breast, liposuction, oral surgeries sleeve gastrectomy 3-8-21


Past Anesthesia/Blood Transfusion Reactions: No Reported Reaction


Past Psychological History: No Psychological Hx Reported


Smoking Status: Former smoker


Past Alcohol Use History: Daily


Additional Past Alcohol Use History / Comment(s): smokes a few times per week 

only, glass of wine daily


Past Drug Use History: None Reported





- Past Family History


  ** Mother


Family Medical History: Cancer





Surgical - Exam


                                   Vital Signs











Temp Pulse Resp BP


 


 98.5 F   53 L  18   144/88 


 


 05/17/21 13:29  05/17/21 13:29  05/17/21 13:29  05/17/21 13:29














- General


well developed, well nourished, no distress





- Eyes


PERRL





- ENT


normal pinna





- Neck


no masses





- Respiratory


normal expansion





- Cardiovascular


Rhythm: regular





- Abdomen


Abdomen: soft, non tender





Bariatric Assessment & Plan


Plan: 





GERD.  Patient is minimal ooze or potential follow-up in 4 weeks.





Bariatric Checklist


Checklist: 


Plan: 





Checklist: 





EGD: 


1. Hiatal hernia: 


2. H. Pylori: 





HgbA1c: 





Vitamin D: 





Smoking: Current some day smoker





Primary care physician referral: [facility] (Liyah David NP)





Psychiatry clearance: 





Cardiology clearance: 





Sleep study: 





Diet journal: 





VTE risk score: 





VTE risk level: 





Rehab needs at discharge:

## 2021-06-21 ENCOUNTER — HOSPITAL ENCOUNTER (OUTPATIENT)
Dept: HOSPITAL 47 - BARWHC3 | Age: 56
Discharge: HOME | End: 2021-06-21
Attending: SURGERY
Payer: COMMERCIAL

## 2021-06-21 VITALS — BODY MASS INDEX: 28.8 KG/M2

## 2021-06-21 VITALS
TEMPERATURE: 98.2 F | SYSTOLIC BLOOD PRESSURE: 147 MMHG | RESPIRATION RATE: 18 BRPM | DIASTOLIC BLOOD PRESSURE: 87 MMHG | HEART RATE: 59 BPM

## 2021-06-21 DIAGNOSIS — Z71.3: ICD-10-CM

## 2021-06-21 DIAGNOSIS — E66.01: Primary | ICD-10-CM

## 2021-06-21 PROCEDURE — 99211 OFF/OP EST MAY X REQ PHY/QHP: CPT

## 2022-10-27 ENCOUNTER — HOSPITAL ENCOUNTER (OUTPATIENT)
Dept: HOSPITAL 47 - RADNMMAIN | Age: 57
Discharge: HOME | End: 2022-10-27
Attending: FAMILY MEDICINE
Payer: COMMERCIAL

## 2022-10-27 DIAGNOSIS — R00.1: Primary | ICD-10-CM

## 2022-10-27 DIAGNOSIS — R53.83: ICD-10-CM

## 2022-10-27 PROCEDURE — 93017 CV STRESS TEST TRACING ONLY: CPT

## 2022-10-27 NOTE — CA
Exercise Stress Test Report 

 

 Name:    Sindi Solorzano 

 

 MRN:    [id number] 

 

 Exam Date: 10/27/2022 10:44 

 

 Exam Location:      [place]  

                     Stress 

 

 Ht (in):     65     Wt (lb):     140    BSA:    1.70 

 

 Ordering Phys:       Stella Rodriguez DO 

 

 Referring Phys:      Liyah Gonzalez [facility] 

 

 Technologist:        Hill Herman 

 

 Age:    56    Gender:    F 

 

 :    1965 

 Procedure CPT: 

 

 Indications:              R00.1 BRADYCARDIA,  R53.83 FATIGUE 

 

 ICD-10 Codes: 

 

 Patient History:          Bradycardia, fitgue and family history of  

                           heart disease 

 

 Medications: 

 

 Meds past 24 hrs: 

 

 Pretest Chest Pain: 

 

 STRESS TEST      Silvio 

 

 Protocol 

 

 

 

 

 Exercise Duration (min:sec):         09:00 

 Max ST Depressions (mm): 

 Angina Score: 

 Hall Score: 

 Resting HR (bpm):      57 

 

 Peak HR (bpm):         158 

 

 Resting BP (mmHg):       142    /   83 

 

 Peak BP (mmHg):       189   /   85 

 

 MPHR:    163     Target HR:      139 

 

 % MPHR:     97 

 METS:  10.3 

 

 Total Dose: 

 Peak Dose: 

 Atropine: 

 Double Product:       24760 

 

 BP Response: 

 

 Stress Termination:       Reached target heart rate 

 

 Stress Symptoms: 

 No chest pain or symptoms 

 

 Stress Summary: 

 

 

  ECG ANALYSIS 

 

 Resting ECG: 

 

 Stress ECG: 

 

 CONCLUSIONS 

 Baseline heart rate 57 beats a minute, Baseline blood pressure  

 142/83 mmHg 90 central EKG showed normal sinus rhythm with  

 normal cardiac intervals 

 Normal ST segments 

 

 He should excisable Silvio protocol for 9 minutes reaching a peak  

 heart rate 158 beats a minute.  The blood pressure 189/85 mmHg 

 No symptoms 

 No ECG evidence of ischemia, no arrhythmias 

 

 Dr. Jones Posey MD 

 (Electronically Signed) 

 Final Date:      2022 21:07

## 2025-01-02 ENCOUNTER — HOSPITAL ENCOUNTER (OUTPATIENT)
Dept: HOSPITAL 47 - RADBDWWP | Age: 60
Discharge: HOME | End: 2025-01-02
Attending: FAMILY MEDICINE
Payer: COMMERCIAL

## 2025-01-02 DIAGNOSIS — R92.333: ICD-10-CM

## 2025-01-02 DIAGNOSIS — Z78.0: ICD-10-CM

## 2025-01-02 DIAGNOSIS — Z12.31: Primary | ICD-10-CM

## 2025-01-02 PROCEDURE — 77080 DXA BONE DENSITY AXIAL: CPT

## 2025-01-02 PROCEDURE — 77063 BREAST TOMOSYNTHESIS BI: CPT

## 2025-01-02 PROCEDURE — 77067 SCR MAMMO BI INCL CAD: CPT

## 2025-01-03 NOTE — BD
EXAMINATION TYPE: Axial Bone Density

 

DATE OF EXAM: 1/2/2025

 

CLINICAL HISTORY: 59 years old Female.  ICD-10 CODE: Z78.0 POST KYLE , Additional History:

 

Height:  64.5

Weight:  151.8

 

FRAX RISK QUESTIONS:

 

Alcohol (3 or more units per day):  yes

Family History (Parent hip fracture):  no

Glucocorticoids (More than 3mos):  no

           (Ex: prednisone, prednisolone, methylprednisolone, dexamethasone, and hydrocortisone).    
     

History of Fracture in Adulthood: no

 

Secondary Osteoporosis:

  1.  Type 1 Diabetes: no

  2.  Hyperthyroidism: no

  3.  Menopause before 45: yes

  4.  Malnutrition: no

  5.  Chronic liver disease: no

Rheumatoid Arthritis: no

Current Tobacco Use: no

 

RISK FACTORS 

 

HISTORY OF: 

Hip Fracture (Right/Left): no

Spine Fracture: no

History of Wrist Fracture: lt wrist

When: age 9

Surgery to Spine/Hip(right/left)/Wrist (right/left): no

 

MEDICATIONS: 

Thyroid Medications: Levothyroxine 

How Long: past 5 years

Osteoporosis Medications: no

 

EXAM MEASUREMENTS: 

Bone mineral densitometry was performed using the [employer] System.

Bone mineral density as measured about the Lumbar spine is:  

----- L1-L4(G/cm2): 1.551

T Score Values are as follows:

----- L1: 2.2

----- L2: 3.0

----- L3: 4.7

----- L4: 2.4

----- L1-L4: 3.1

 

Z Score Values are as follows:

----- L1: 3.2

----- L2: 4.1

----- L3: 5.8

----- L4: 3.4

----- L1-L4: 4.1

 

Baseline Study 

 

Bone mineral density about the R hip (g/cm2): 1.247

Bone mineral density about the L hip (g/cm2): 1.227

T Score values are as follows:

-----R Neck: 2.0

-----L Neck: 1.4

-----R Total: 1.9

-----L Total: 1.7

 

Z Score values are as follows:

-----R Neck: 3.2

-----L Neck: 2.5

-----R Total: 2.7

-----L Total: 2.6

 

Baseline Study 

 

 

FRAX%s: The graph provided illustrates a 6.1 % chance for a major osteoporotic fx and a 0.0% chance f
or the hips probability for fx in 10 years time.

 

 

 

 

IMPRESSION:

Normal (Values between +1 and -1 indicate normal bone mass).  Consider repeating this study in 5 year
s or sooner if there is some new clinical indication.

 

NOTE:  T-SCORE=SD OF THE YOUNG ADULT MEAN.

 

 

 

 

 

 

[facility], Workstation: [id number], 1/3/2025 6:17 PM

## 2025-01-06 NOTE — MM
Reason for Exam: Screening  (asymptomatic). 

Last mammogram was performed 6 year(s) and 7 month(s) ago. 





Patient History: 

Menarche at age 11. First Full-Term Pregnancy at age 19. Postmenopausal. MG pre op needle loc LT - 2

on the Left side. 





Risk Values: 

Kathryn 5 year model risk: 1.5%.

NCI Lifetime model risk: 7.7%.





Prior Study Comparison: 

11/29/2010  Screening Mammogram, [facility]. 6/13/2014 Bilateral Screening Mammogram, [facility].

6/27/2018 Bilateral Screening Mammogram, [facility]. 





Tissue Density: 

The breasts are heterogeneously dense, which may obscure small masses.





Findings: 

Analyzed By CAD. 

Right breast: There is no suspicious group of microcalcifications or new suspicious mass.

Benign-appearing calcifications right breast.



Left breast: There is no suspicious group of microcalcifications or new suspicious mass.

Benign-appearing calcifications left breast. 





Overall Assessment: Benign, BI-RAD 2





Management: 

Screening Mammogram of both breasts in 1 year.

[facility] will attempt to contact patient to return for supplemental views and

ultrasound if indicated.



Patient should continue monthly self-breast exams.  A clinical breast exam by your physician is

recommended on an annual basis.

This exam should not preclude additional follow-up of suspicious palpable abnormalities.



Note on Kathryn scores and lifetime risk:

1. A Kathryn score greater than 3% is considered moderate risk. If this is the case, consider

specialist referral to assess eligibility for a risk reducing agent.

2. If overall lifetime risk for the development of breast cancer is 20% or higher, the patient may

qualify for future screening with alternating mammogram and breast MRI.



[facility], Workstation: [id number], 1/6/2025 8:30 AM.



Electronically signed and approved by: Farshad Salcedo DO